# Patient Record
Sex: MALE | Race: BLACK OR AFRICAN AMERICAN
[De-identification: names, ages, dates, MRNs, and addresses within clinical notes are randomized per-mention and may not be internally consistent; named-entity substitution may affect disease eponyms.]

---

## 2019-11-05 ENCOUNTER — HOSPITAL ENCOUNTER (OUTPATIENT)
Dept: HOSPITAL 84 - D.NM | Age: 49
Discharge: HOME | End: 2019-11-05
Attending: ORTHOPAEDIC SURGERY
Payer: COMMERCIAL

## 2019-11-05 DIAGNOSIS — M25.562: Primary | ICD-10-CM

## 2019-11-05 LAB
EOSINOPHIL NFR BLD: 2 % (ref 0–7)
ERYTHROCYTE [DISTWIDTH] IN BLOOD BY AUTOMATED COUNT: 12.5 % (ref 11.5–14.5)
ERYTHROCYTE [SEDIMENTATION RATE] IN BLOOD: 3 MM/HR (ref 0–15)
HCT VFR BLD CALC: 42.3 % (ref 42–54)
HGB BLD-MCNC: 13.8 G/DL (ref 13.5–17.5)
LYMPHOCYTES NFR BLD AUTO: 53 % (ref 15–50)
MCH RBC QN AUTO: 29.5 PG (ref 26–34)
MCHC RBC AUTO-ENTMCNC: 32.6 G/DL (ref 31–37)
MCV RBC: 90.4 FL (ref 80–100)
MONOCYTES NFR BLD: 7 % (ref 2–11)
NEUTROPHILS NFR BLD AUTO: 36 % (ref 40–80)
PLATELET # BLD EST: NORMAL 10*3/UL
PLATELET # BLD: 294 10X3/UL (ref 130–400)
PMV BLD AUTO: 10.2 FL (ref 7.4–10.4)
RBC # BLD AUTO: 4.68 10X6/UL (ref 4.2–6.1)
ROULEAUX BLD QL SMEAR: (no result)
WBC # BLD AUTO: 5 10X3/UL (ref 4.8–10.8)

## 2019-11-18 ENCOUNTER — HOSPITAL ENCOUNTER (OUTPATIENT)
Dept: HOSPITAL 84 - D.LABREF | Age: 49
Discharge: HOME | End: 2019-11-18
Attending: ORTHOPAEDIC SURGERY
Payer: COMMERCIAL

## 2019-11-18 DIAGNOSIS — M25.562: Primary | ICD-10-CM

## 2019-11-18 LAB
EOSINOPHIL NFR FLD MANUAL: 1 %
MACROPHAGES NFR FLD MANUAL: 25 %
NEUTROPHILS NFR FLD MANUAL: 15 %

## 2020-02-19 ENCOUNTER — HOSPITAL ENCOUNTER (INPATIENT)
Dept: HOSPITAL 84 - D.SDCHOLD | Age: 50
LOS: 29 days | Discharge: HOME | DRG: 468 | End: 2020-03-19
Attending: ORTHOPAEDIC SURGERY | Admitting: ORTHOPAEDIC SURGERY
Payer: COMMERCIAL

## 2020-02-19 VITALS — WEIGHT: 220.46 LBS | BODY MASS INDEX: 30.86 KG/M2 | BODY MASS INDEX: 30.86 KG/M2 | HEIGHT: 71 IN

## 2020-02-19 DIAGNOSIS — T84.84XA: Primary | ICD-10-CM
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
## 2020-02-19 DIAGNOSIS — G47.00: ICD-10-CM

## 2020-02-19 DIAGNOSIS — I10: ICD-10-CM

## 2020-03-11 LAB
ANION GAP SERPL CALC-SCNC: 13.1 MMOL/L (ref 8–16)
ANISOCYTOSIS BLD QL SMEAR: (no result)
APTT BLD: 29.2 SECONDS (ref 22.8–39.4)
BILIRUB SERPL-MCNC: NEGATIVE MG/DL
BUN SERPL-MCNC: 13 MG/DL (ref 7–18)
CALCIUM SERPL-MCNC: 8.8 MG/DL (ref 8.5–10.1)
CHLORIDE SERPL-SCNC: 102 MMOL/L (ref 98–107)
CO2 SERPL-SCNC: 30.9 MMOL/L (ref 21–32)
CREAT SERPL-MCNC: 1.1 MG/DL (ref 0.6–1.3)
EOSINOPHIL NFR BLD: 2 % (ref 0–7)
ERYTHROCYTE [DISTWIDTH] IN BLOOD BY AUTOMATED COUNT: 12.8 % (ref 11.5–14.5)
GLUCOSE SERPL-MCNC: 87 MG/DL (ref 74–106)
GLUCOSE SERPL-MCNC: NEGATIVE MG/DL
HCT VFR BLD CALC: 42.7 % (ref 42–54)
HGB BLD-MCNC: 14.4 G/DL (ref 13.5–17.5)
INR PPP: 0.94 (ref 0.85–1.17)
KETONES UR STRIP-MCNC: NEGATIVE MG/DL
LYMPHOCYTES NFR BLD AUTO: 33 % (ref 15–50)
MCH RBC QN AUTO: 30.6 PG (ref 26–34)
MCHC RBC AUTO-ENTMCNC: 33.7 G/DL (ref 31–37)
MCV RBC: 90.7 FL (ref 80–100)
MONOCYTES NFR BLD: 11 % (ref 2–11)
NEUTROPHILS NFR BLD AUTO: 52 % (ref 40–80)
NITRITE UR-MCNC: NEGATIVE MG/ML
OSMOLALITY SERPL CALC.SUM OF ELEC: 281 MOSM/KG (ref 275–300)
PH UR STRIP: 5 [PH] (ref 5–6)
PLATELET # BLD EST: NORMAL 10*3/UL
PLATELET # BLD: 308 10X3/UL (ref 130–400)
PMV BLD AUTO: 9.6 FL (ref 7.4–10.4)
POTASSIUM SERPL-SCNC: 4 MMOL/L (ref 3.5–5.1)
PROTHROMBIN TIME: 12.5 SECONDS (ref 11.6–15)
RBC # BLD AUTO: 4.71 10X6/UL (ref 4.2–6.1)
SODIUM SERPL-SCNC: 142 MMOL/L (ref 136–145)
SP GR UR STRIP: 1.01 (ref 1–1.02)
UROBILINOGEN UR-MCNC: NORMAL MG/DL
WBC # BLD AUTO: 4.8 10X3/UL (ref 4.8–10.8)

## 2020-03-16 VITALS — SYSTOLIC BLOOD PRESSURE: 144 MMHG | DIASTOLIC BLOOD PRESSURE: 97 MMHG

## 2020-03-16 VITALS — DIASTOLIC BLOOD PRESSURE: 103 MMHG | SYSTOLIC BLOOD PRESSURE: 150 MMHG

## 2020-03-16 VITALS — DIASTOLIC BLOOD PRESSURE: 91 MMHG | SYSTOLIC BLOOD PRESSURE: 171 MMHG

## 2020-03-16 VITALS — DIASTOLIC BLOOD PRESSURE: 97 MMHG | SYSTOLIC BLOOD PRESSURE: 147 MMHG

## 2020-03-16 VITALS — DIASTOLIC BLOOD PRESSURE: 98 MMHG | SYSTOLIC BLOOD PRESSURE: 150 MMHG

## 2020-03-16 VITALS — DIASTOLIC BLOOD PRESSURE: 99 MMHG | SYSTOLIC BLOOD PRESSURE: 144 MMHG

## 2020-03-16 PROCEDURE — 0SPD0JZ REMOVAL OF SYNTHETIC SUBSTITUTE FROM LEFT KNEE JOINT, OPEN APPROACH: ICD-10-PCS | Performed by: ORTHOPAEDIC SURGERY

## 2020-03-16 PROCEDURE — 0SRD069 REPLACEMENT OF LEFT KNEE JOINT WITH OXIDIZED ZIRCONIUM ON POLYETHYLENE SYNTHETIC SUBSTITUTE, CEMENTED, OPEN APPROACH: ICD-10-PCS | Performed by: ORTHOPAEDIC SURGERY

## 2020-03-16 NOTE — NUR
SITTING UP IN BED WATCHING TV. ALERT AND ORIENTED X4. RESP EVEN AND
NONLABORED. ACE WRAP NOTED TO LT KNEE WITH BLOODY DRAINAGE NOTED FROM EARLIER.
ICE PACK IN USE. PLEXI BOOT NOTED TO LT FOOT. SCD NOTED TO RLE. 1/2 NS @ 100
MLHR INFUSING IN RT FOREARM. RATES PAIN IN LT KNEE AS 7. CL IN REACH.

## 2020-03-17 VITALS — DIASTOLIC BLOOD PRESSURE: 98 MMHG | SYSTOLIC BLOOD PRESSURE: 160 MMHG

## 2020-03-17 VITALS — SYSTOLIC BLOOD PRESSURE: 144 MMHG | DIASTOLIC BLOOD PRESSURE: 79 MMHG

## 2020-03-17 VITALS — SYSTOLIC BLOOD PRESSURE: 127 MMHG | DIASTOLIC BLOOD PRESSURE: 72 MMHG

## 2020-03-17 VITALS — DIASTOLIC BLOOD PRESSURE: 81 MMHG | SYSTOLIC BLOOD PRESSURE: 127 MMHG

## 2020-03-17 VITALS — SYSTOLIC BLOOD PRESSURE: 145 MMHG | DIASTOLIC BLOOD PRESSURE: 97 MMHG

## 2020-03-17 VITALS — SYSTOLIC BLOOD PRESSURE: 118 MMHG | DIASTOLIC BLOOD PRESSURE: 76 MMHG

## 2020-03-17 LAB
ERYTHROCYTE [DISTWIDTH] IN BLOOD BY AUTOMATED COUNT: 12.6 % (ref 11.5–14.5)
HCT VFR BLD CALC: 37.9 % (ref 42–54)
HGB BLD-MCNC: 12.3 G/DL (ref 13.5–17.5)
MCH RBC QN AUTO: 29.6 PG (ref 26–34)
MCHC RBC AUTO-ENTMCNC: 32.5 G/DL (ref 31–37)
MCV RBC: 91.3 FL (ref 80–100)
PLATELET # BLD: 228 10X3/UL (ref 130–400)
PMV BLD AUTO: 9.8 FL (ref 7.4–10.4)
RBC # BLD AUTO: 4.15 10X6/UL (ref 4.2–6.1)
WBC # BLD AUTO: 7.7 10X3/UL (ref 4.8–10.8)

## 2020-03-17 NOTE — NUR
REQUESTED PAIN MED FOR PAIN LEVEL 8 TO LEFT KNEE. GIVEN ONE PERCOCET PO FOR
SAME. WILL MONITOR. UP TO CHIAR AT BEDSIDE PER PT. DENIES OTHER NEEDS.
CONTINUES WITH HICCUPS.

## 2020-03-17 NOTE — NUR
ATE ONLY A FEW BITES OF SUPPER. WAS WORN OUT FROM HICCUPS. THESE HAVE FINALLY
STOPPED. NO CHANGES NOTED. DENIES NEEDS.

## 2020-03-17 NOTE — NUR
AWAKE AND ALERT. ORIENTED X3. C/O LEFT KNEE PAIN LEVEL 4. WILL MONITOR. LUNGS
ARE CLEAR BILATERALLY, NO COUGH NOTED. SKIN IS INTACT WITHOUT REDNESS EXCEPT
INCISION TO LEFT KNEE WHICH HAS A DRY INTACT DRESSING IN PLACE. BREAKFAST
SERVED IN ROOM. DENIES NEEDS.

## 2020-03-17 NOTE — NUR
AWAKE AND ALERT. ORIENTED X3. C/O SOME PAIN TO LEFT KNEE. WILL MONITOR. ALSO
HAS HICCUPS AT THIS TIME. WILL MONITOR. LUNGS ARE CLEAR BILATERALLY, NO COUGH
NOTED. SKIN IS INTACT WITHOUT REDNESS EXCEPT INCISION TO LEFT KNEE WHICH HAS A
DRY INTACT DRESSING IN PLACE. IV TO RIGHT FOREARM IS PATENT WITHOUT REDNESS AT
INSERTION SITE. BREAKFAST SERVED IN ROOM. DENIES NEEDS.

## 2020-03-18 VITALS — DIASTOLIC BLOOD PRESSURE: 92 MMHG | SYSTOLIC BLOOD PRESSURE: 145 MMHG

## 2020-03-18 VITALS — SYSTOLIC BLOOD PRESSURE: 140 MMHG | DIASTOLIC BLOOD PRESSURE: 83 MMHG

## 2020-03-18 VITALS — SYSTOLIC BLOOD PRESSURE: 122 MMHG | DIASTOLIC BLOOD PRESSURE: 58 MMHG

## 2020-03-18 VITALS — SYSTOLIC BLOOD PRESSURE: 148 MMHG | DIASTOLIC BLOOD PRESSURE: 98 MMHG

## 2020-03-18 LAB
ERYTHROCYTE [DISTWIDTH] IN BLOOD BY AUTOMATED COUNT: 12.5 % (ref 11.5–14.5)
HCT VFR BLD CALC: 33.4 % (ref 42–54)
HGB BLD-MCNC: 10.8 G/DL (ref 13.5–17.5)
MCH RBC QN AUTO: 29.2 PG (ref 26–34)
MCHC RBC AUTO-ENTMCNC: 32.3 G/DL (ref 31–37)
MCV RBC: 90.3 FL (ref 80–100)
PLATELET # BLD: 199 10X3/UL (ref 130–400)
PMV BLD AUTO: 9.9 FL (ref 7.4–10.4)
RBC # BLD AUTO: 3.7 10X6/UL (ref 4.2–6.1)
WBC # BLD AUTO: 7.4 10X3/UL (ref 4.8–10.8)

## 2020-03-18 NOTE — OP
PATIENT NAME:  BRENDA CALDERON JR                       MEDICAL RECORD: [id number]
:72                                             LOCATION:D.M3     D.1210
                                                         ADMISSION DATE:20
SURGEON:  ZAHIRA LACEY MD        
 
 
DATE OF OPERATION:  2020
 
PREOPERATIVE DIAGNOSIS:  Painful total knee arthroplasty of the left knee.
 
POSTOPERATIVE DIAGNOSIS:  Painful total knee arthroplasty of the left knee.
 
PROCEDURE:  Revision total knee arthroplasty.
 
SURGEON:  Zahira Lacey MD
 
ANESTHESIA:  SONA He
 
INTRAOPERATIVE COMPLICATIONS:  None.
 
SUMMARY OF PATHOLOGIC FINDINGS:  Upon entering the knee, the patient mostly had
a very large effusion.  There was evidence of lucency underneath the prosthesis
as it was removed very easily from the femoral side.  The patient also had a
very long, approximately 6 cm x 1 cm x 1 cm heterotopic ossification in the mid
substance of the quad muscle.  This required excision and extensive quadriceps
repair after the total knee arthroplasty was revised.  The total knee revision
was the Triathlon system, size 5 distal femur with a 15 x 150 fluted stem.  The
tibial baseplate was a size 5 with a 6 mm offset set at 12 o'clock with 17 x 100
stem in the tibia.  All was cemented.  The tibial baseplate X3 polyethylene size
5.
 
ESTIMATED BLOOD LOSS:  200 cc.
 
OPERATIVE SUMMARY IN DETAIL:  After obtaining the appropriate preoperative
orthopedic surgery consent as well as anesthetic consultation, evaluation, and
clearance, the patient was brought to the operating room and placed on the
operating table in the supine position.  After adequate general laryngeal mask
airway was administered, tourniquet was placed on the proximal aspect of left
lower extremity.  Left lower extremity was then prepped and draped in routine
sterile fashion.  The leg was exsanguinated.  Tourniquet inflated to 350 mmHg. 
Incision was made midline with the previous incision made.  This was dissected
carefully down to the paramedian aspect of the knee where it does appear the
patient had a previous straight line incision with a medial patellar heal rather
than a paramedian arthrotomy.  At this time; however paramedian arthrotomy was
performed.  Careful dissection was done in the mid substance of the quad to
entirely identify the large area of heterotopic ossification as mentioned above.
 It was removed en bloc and sent to pathology for permanent specimen.  At this
point, further takedown was required including partial tibial tubercle release
of the patella tendon, which was enhanced with fixation at the end of the case
using a suture anchor from Arthrex.  After exposure of the knee, the cross pin
of the Biomet prosthesis was removed as was the polyethylene.  Next with very
little bone loss and very little degree of difficulty, distal femur was removed
after a few passes with flexible osteotomes.  This was indicating that a bone
scan was likely right in its loosening.  Again, there was no evidence for
infection.  Having completed this, the tibial baseplate was likewise removed and
it was also rather easily removed, all excess cobalt blue cement was removed as
well.  Serial and sequential reaming of the femur was then followed by
measurements and chamfer cuts.  The trial corresponding to the above size was
 
 
 
OPERATIVE REPORT                               [id number]    BRENDA CALDERON JR     
 
 
put into place and it fit nicely.  Attention was then returned to the proximal
tibia.  After all intramedullary cement had been removed, serial and sequential
reaming were done for a size 17.  Proximal cleanup cut was done and then the
size 5 trial was put into place and it was found that a 6 mm offset set at 12:00
was most appropriate for appropriate base plate positioning.  This was then held
into place with the pins while proximal tibia stamping was done for final
preparations.  Guide was then put and several different trials were put in.  It
was felt that the 11 was most stable as 13 seemed to be too tight and 9 was
somewhat unstable; 11 was put into place, taken through range of motion and
found to be stable in all planes.  At this point, all of the trials were
removed.  Pulsatile irrigation was then done to the entire cavity, removal of
all small loose debris was then followed by cementing the implants which had
been put together on the back table.  The implants were cemented into place and
the polyethylene was put into place.  The knee was reduced, taken through range
of motion and found to be stable in all planes.  At this point, a suture anchor
from Arthrex was placed into the tibial tuberosity and the 2 tails of the
FiberWire were then placed through the patella tendon to reinforce the patellar
tendon that had been gently released for exposure.  Having completed this, the
proximal quadriceps tendon that required excision of the large heterotopic
ossification was reconstructed using Ethibond with a slight VMO advancement and
repair of the area that was left cavitary.  The paramedian arthrotomy was then
closed with #2 Ethibond by Tres Bonner after the knee was filled with a gram of
vancomycin and a gram of tobramycin.  Having completed this, the skin was
reapproximated with #1 Vicryl followed by 2-0 Vicryl and skin staples.  Sterile
dressings were applied.  Tourniquet was deflated.  A compass hinged knee brace
was placed with a 0 to 30 lock.  The patient was awakened and taken to recovery
in stable condition.  All final needle and sponge counts were correct.
 
TRANSINT:[id number] Voice Confirmation ID: [id number] DOCUMENT ID: [id number]
                                           
                                           ABHIJIT MACKAY, ZAHIRA CHAKRABORTY        
 
 
 
Electronically Signed by ZAHIRA LACEY MD on 20 at 1111
 
 
 
 
 
 
 
 
 
 
 
 
CC:                                                             [id number]
DICTATION DATE: 20 1408     :     20 2229      ADM IN  
                                                                              
[id number] [place] [address]

## 2020-03-18 NOTE — NUR
PATIENT RESTING IN BED WITH NO S/S OF DISTRESS AND REQUESTED A PAIN PILL.
PATIENT DENIES OTHER NEEDS AT THIS TIME. BED IN LOWEST POSITION AND CALL
LIGHT WITHIN REACH. ENCOURAGED THE PATIENT TO CALL IF HE HAS OTHER NEEDS. WILL
CONTINUE TO MONITOR.

## 2020-03-18 NOTE — NUR
PT ALERT X 4. BREATH SOUNDS CLEAR BILAT. HICCUPS. IV TO RIGHT FOREARM, SALINE
LOCKED. ACE TO LEFT KNEE, SWELLING. PT REPORTING PAIN OF 7/10, MEDICATED PER
ORDERS, WILL MONITOR. BED LOW, CALL LIGHT IN REACH. NO OTHER NEEDS AT THIS
TIME.

## 2020-03-18 NOTE — NUR
STILL HAS HICCUPS. MEDICATED WITH THORAZINE PO AS ORDERED FOR THIS. C/O PAIN
IN LT KNEE. MEDICATED WITH PERCOCET AS ORDERED. CL IN REACH.

## 2020-03-19 VITALS — DIASTOLIC BLOOD PRESSURE: 89 MMHG | SYSTOLIC BLOOD PRESSURE: 126 MMHG

## 2020-03-19 VITALS — SYSTOLIC BLOOD PRESSURE: 137 MMHG | DIASTOLIC BLOOD PRESSURE: 94 MMHG

## 2020-03-19 VITALS — SYSTOLIC BLOOD PRESSURE: 140 MMHG | DIASTOLIC BLOOD PRESSURE: 88 MMHG

## 2020-03-19 VITALS — DIASTOLIC BLOOD PRESSURE: 82 MMHG | SYSTOLIC BLOOD PRESSURE: 138 MMHG

## 2020-03-19 NOTE — NUR
DRESSING CHANGED PER ORDER. ALL DISCHARGE INSTRUCTIONS COVERED WITH PT. PT
DENIES FURTHER QUESTIONS/CONCERNS/NEEDS AT THIS TIME. PIV TO RIGHT FA REMOVED
WITH CATHETER TIP INTACT. DRESSING APPLIED. ALL DISCHARGE PAPERS SIGNED BY PT.
PT TO NOTIFY NURSE OF NEED OF TRANSPORT WHEN TRANSPORTATION HOME ARRIVES. PT
DENIES FURTHER NEEDS. SIGNED DISCHARGE PAPERS PLACED IN PT CHART.

## 2020-03-19 NOTE — NUR
PT TRANSPORTED OFF FLOOR VIA [facility] ESCORTED BY [facility]. PT DENIES
FURTHER QUESTIONS/CONCERNS/NEEDS AT THIS TIME. PT THANKS THIS NURSE FOR CARE
GIVEN DURING THIS SHIFT.

## 2020-03-19 NOTE — NUR
PT IS SITTING IN BED WITH EYES OPEN. RESPIRATIONS ARE EVEN AND UNLABORED. PT
IS AAO X 4. INCENTIVE SPIROMETER AT BEDSIDE. DRESSING TO LEFT KNEE NOTED WITH
SMALL AMOUNT OF DRIED BLOODY DRAINAGE AT BOTTOM OF ACE WRAP. PT DENIES
PRESENCE OF NUMBNESS/TINGLING TO BUE AND BLE EXTREMITIES. BILATERAL PEDAL
PULSES PALP AND CAP REFILL IS < 3. PT DENIES PRESENCE OF N/V AT THIS TIME.
BED IS IN LOWEST POSITION. CALL LIGHT AND BEDSIDE TABLE ARE WITHIN REACH. SIDE
RAILS X 2. PT DENIES FURTHER NEEDS. WILL CONT TOMONITOR.

## 2020-08-11 ENCOUNTER — HOSPITAL ENCOUNTER (OUTPATIENT)
Dept: HOSPITAL 84 - D.LAB | Age: 50
Discharge: HOME | End: 2020-08-11
Attending: ORTHOPAEDIC SURGERY
Payer: COMMERCIAL

## 2020-08-11 VITALS — BODY MASS INDEX: 30.7 KG/M2

## 2020-08-11 DIAGNOSIS — M25.562: Primary | ICD-10-CM

## 2020-08-11 LAB
ANION GAP SERPL CALC-SCNC: 8.4 MMOL/L (ref 8–16)
BASOPHILS NFR BLD AUTO: 0.4 % (ref 0–2)
BUN SERPL-MCNC: 16 MG/DL (ref 7–18)
CALCIUM SERPL-MCNC: 8.6 MG/DL (ref 8.5–10.1)
CHLORIDE SERPL-SCNC: 103 MMOL/L (ref 98–107)
CO2 SERPL-SCNC: 31.6 MMOL/L (ref 21–32)
CREAT SERPL-MCNC: 1.1 MG/DL (ref 0.6–1.3)
CRP SERPL-MCNC: 0.7 MG/DL (ref 0–0.9)
EOSINOPHIL NFR BLD: 3.6 % (ref 0–7)
ERYTHROCYTE [DISTWIDTH] IN BLOOD BY AUTOMATED COUNT: 12.7 % (ref 11.5–14.5)
ERYTHROCYTE [SEDIMENTATION RATE] IN BLOOD: 7 MM/HR (ref 0–15)
GLUCOSE SERPL-MCNC: 97 MG/DL (ref 74–106)
HCT VFR BLD CALC: 42.4 % (ref 42–54)
HGB BLD-MCNC: 13.6 G/DL (ref 13.5–17.5)
IMM GRANULOCYTES NFR BLD: 0.2 % (ref 0–5)
LYMPHOCYTES NFR BLD AUTO: 41.8 % (ref 15–50)
MCH RBC QN AUTO: 28.6 PG (ref 26–34)
MCHC RBC AUTO-ENTMCNC: 32.1 G/DL (ref 31–37)
MCV RBC: 89.1 FL (ref 80–100)
MONOCYTES NFR BLD: 8.9 % (ref 2–11)
NEUTROPHILS NFR BLD AUTO: 45.1 % (ref 40–80)
OSMOLALITY SERPL CALC.SUM OF ELEC: 278 MOSM/KG (ref 275–300)
PLATELET # BLD: 250 10X3/UL (ref 130–400)
PMV BLD AUTO: 9.4 FL (ref 7.4–10.4)
POTASSIUM SERPL-SCNC: 4 MMOL/L (ref 3.5–5.1)
RBC # BLD AUTO: 4.76 10X6/UL (ref 4.2–6.1)
SODIUM SERPL-SCNC: 139 MMOL/L (ref 136–145)
WBC # BLD AUTO: 5.3 10X3/UL (ref 4.8–10.8)

## 2020-08-24 ENCOUNTER — HOSPITAL ENCOUNTER (OUTPATIENT)
Dept: HOSPITAL 84 - D.NM | Age: 50
Discharge: HOME | End: 2020-08-24
Attending: ORTHOPAEDIC SURGERY
Payer: MEDICARE

## 2020-08-24 VITALS — BODY MASS INDEX: 30.7 KG/M2

## 2020-08-24 DIAGNOSIS — M25.562: Primary | ICD-10-CM

## 2020-08-27 ENCOUNTER — HOSPITAL ENCOUNTER (OUTPATIENT)
Dept: HOSPITAL 84 - D.RAD | Age: 50
Discharge: HOME | End: 2020-08-27
Attending: ORTHOPAEDIC SURGERY
Payer: COMMERCIAL

## 2020-08-27 VITALS — BODY MASS INDEX: 30.7 KG/M2

## 2020-08-27 DIAGNOSIS — M25.562: Primary | ICD-10-CM

## 2020-09-03 ENCOUNTER — HOSPITAL ENCOUNTER (OUTPATIENT)
Dept: HOSPITAL 84 - D.OPS | Age: 50
Discharge: HOME | End: 2020-09-03
Attending: ORTHOPAEDIC SURGERY
Payer: COMMERCIAL

## 2020-09-03 VITALS — WEIGHT: 220 LBS | HEIGHT: 71 IN | BODY MASS INDEX: 30.8 KG/M2

## 2020-09-03 VITALS — SYSTOLIC BLOOD PRESSURE: 138 MMHG | DIASTOLIC BLOOD PRESSURE: 90 MMHG

## 2020-09-03 DIAGNOSIS — M65.862: ICD-10-CM

## 2020-09-03 DIAGNOSIS — M25.562: Primary | ICD-10-CM

## 2020-09-03 DIAGNOSIS — Z96.652: ICD-10-CM

## 2020-09-03 LAB
GLUCOSE - BODY FLUID: 24 MG/DL
NEUTROPHILS NFR FLD MANUAL: 71 %
PROTEIN - BODY FLUID: 3.4 G/DL

## 2020-09-03 NOTE — OP
PATIENT NAME:  BRENDA CALDERON JR                       MEDICAL RECORD: [id number]
:72                                             LOCATION:D.OPS          
                                                         ADMISSION DATE:        
SURGEON:  ZAHIRA LACEY MD        
 
 
DATE OF OPERATION:  2020
 
PREOPERATIVE DIAGNOSIS:  Stiff painful total knee arthroplasty.
 
POSTOPERATIVE DIAGNOSIS:  Stiff painful total knee arthroplasty.
 
PROCEDURES:
1.  Diagnostic arthroscopy of the left knee.
2.  Biopsy of the synovium.
3.  Aspiration of the left knee.
4.  Manipulation of the left knee.
 
SURGEON:  Zahira Lacey MD
 
ANESTHESIA:  General.
 
INTRAOPERATIVE COMPLICATIONS:  None.
 
SUMMARY OF PATHOLOGIC FINDINGS:  The patient did not have profound synovitis and
a tap today did reveal approximately 30 cc of serosanguineous fluid.  This was
sent for synovial fluid analysis with cultures.  The patient does have an
extremely stiff knee and this may be all arthrofibrosis related.
 
INDICATIONS:  A 50-year-old male who has had a complete workup.  He came to me
for revision of total knee arthroplasty, which has been painful for him since it
was done.  His workup for infection has been negative.  His workup for
inflammation has been negative.  His bone scan has been essentially negative,
but he has continued severe swelling.  Surgery as performed today was then
proceeded with.
 
OPERATIVE SUMMARY IN DETAIL:  After obtaining the appropriate preoperative
orthopedic surgery consent as well as anesthetic consultation, evaluation and
clearance, the patient was brought to the operating room and placed on the
operating table in supine position.  After adequate general laryngeal mask was
administered, tourniquet was placed on the proximal aspect of left lower
extremity.  Left lower extremity was then prepped and draped in routine sterile
fashion.  The leg was elevated and exsanguinated, tourniquet was inflated to 350
mmHg.  A routine timeout was taken and agreed upon by all given the patient's
unique identifiers.
 
Arthroscopy was established in the knee cavity.  Diagnostic arthroscopy did not
reveal a substantial amount of synovitis, only a very substantial amount of scar
tissue.  Lysis of adhesions was performed.  The portion were I thought there
might be the best synovial sample was biopsied multiple times and sent for
permanent section.  Prior to beginning arthroscopy; however, it is noted that an
18-gauge spinal needle was utilized to retrieve approximately 30 cc of fluid as
described above.  After biopsies have been taken and multiple lysis of adhesions
had been done, the knee was then manipulated with only approximately
manipulation achieved to about 100 degrees and extension to approximately -10
degrees.  The knee was insufflated with 0.25% Marcaine plain.  Arthroscopy
portals were closed in routine interrupted fashion using 4-0 Prolene.  Sterile
dressings were applied.  The patient was awakened and taken to recovery room in
 
 
 
OPERATIVE REPORT                               [id number]    BRENDA CALDERON JR     
 
 
stable condition.  All final needle and sponge counts were correct.
 
TRANSINT:[id number] Voice Confirmation ID: [id number] DOCUMENT ID: [id number]
                                           
                                           ABHIJIT MACKAY, ZAHIRA CHAKRABORTY        
 
 
 
 
 
 
 
 
 
 
 
 
 
 
 
 
 
 
 
 
 
 
 
 
 
 
 
 
 
 
 
 
 
 
 
 
 
 
 
 
 
 
CC:                                                             [id number]
DICTATION DATE: 20 1311     :     20 1608      [facility] [place] 
                                                                      20
[id number] [place] [id number]